# Patient Record
Sex: MALE
[De-identification: names, ages, dates, MRNs, and addresses within clinical notes are randomized per-mention and may not be internally consistent; named-entity substitution may affect disease eponyms.]

---

## 2024-01-30 PROBLEM — Z00.00 ENCOUNTER FOR PREVENTIVE HEALTH EXAMINATION: Status: ACTIVE | Noted: 2024-01-30

## 2024-01-31 ENCOUNTER — APPOINTMENT (OUTPATIENT)
Age: 69
End: 2024-01-31
Payer: MEDICARE

## 2024-01-31 ENCOUNTER — NON-APPOINTMENT (OUTPATIENT)
Age: 69
End: 2024-01-31

## 2024-01-31 VITALS
HEART RATE: 77 BPM | OXYGEN SATURATION: 98 % | SYSTOLIC BLOOD PRESSURE: 112 MMHG | TEMPERATURE: 98.8 F | DIASTOLIC BLOOD PRESSURE: 70 MMHG | BODY MASS INDEX: 29.66 KG/M2 | HEIGHT: 65 IN | WEIGHT: 178 LBS

## 2024-01-31 DIAGNOSIS — Z78.9 OTHER SPECIFIED HEALTH STATUS: ICD-10-CM

## 2024-01-31 PROCEDURE — 99204 OFFICE O/P NEW MOD 45 MIN: CPT

## 2024-01-31 PROCEDURE — G2211 COMPLEX E/M VISIT ADD ON: CPT

## 2024-01-31 RX ORDER — RANOLAZINE 500 MG/1
500 TABLET, FILM COATED, EXTENDED RELEASE ORAL
Refills: 0 | Status: ACTIVE | COMMUNITY

## 2024-02-01 LAB
ALBUMIN SERPL ELPH-MCNC: 4.6 G/DL
ALP BLD-CCNC: 63 U/L
ALT SERPL-CCNC: 20 U/L
ANION GAP SERPL CALC-SCNC: 11 MMOL/L
AST SERPL-CCNC: 23 U/L
BASOPHILS # BLD AUTO: 0.05 K/UL
BASOPHILS NFR BLD AUTO: 0.7 %
BILIRUB SERPL-MCNC: 0.5 MG/DL
BUN SERPL-MCNC: 19 MG/DL
CALCIUM SERPL-MCNC: 9.4 MG/DL
CHLORIDE SERPL-SCNC: 105 MMOL/L
CHOLEST SERPL-MCNC: 178 MG/DL
CO2 SERPL-SCNC: 22 MMOL/L
CREAT SERPL-MCNC: 1.13 MG/DL
EGFR: 72 ML/MIN/1.73M2
EOSINOPHIL # BLD AUTO: 0.34 K/UL
EOSINOPHIL NFR BLD AUTO: 4.8 %
ESTIMATED AVERAGE GLUCOSE: 97 MG/DL
GLUCOSE SERPL-MCNC: 93 MG/DL
HBA1C MFR BLD HPLC: 5 %
HCT VFR BLD CALC: 41.1 %
HDLC SERPL-MCNC: 39 MG/DL
HGB BLD-MCNC: 14.1 G/DL
IMM GRANULOCYTES NFR BLD AUTO: 0.4 %
LDLC SERPL CALC-MCNC: 108 MG/DL
LYMPHOCYTES # BLD AUTO: 1.46 K/UL
LYMPHOCYTES NFR BLD AUTO: 20.4 %
MAN DIFF?: NORMAL
MCHC RBC-ENTMCNC: 31.3 PG
MCHC RBC-ENTMCNC: 34.3 GM/DL
MCV RBC AUTO: 91.3 FL
MONOCYTES # BLD AUTO: 0.5 K/UL
MONOCYTES NFR BLD AUTO: 7 %
NEUTROPHILS # BLD AUTO: 4.76 K/UL
NEUTROPHILS NFR BLD AUTO: 66.7 %
NONHDLC SERPL-MCNC: 139 MG/DL
NT-PROBNP SERPL-MCNC: 149 PG/ML
PLATELET # BLD AUTO: 177 K/UL
POTASSIUM SERPL-SCNC: 4.5 MMOL/L
PROT SERPL-MCNC: 6.7 G/DL
RBC # BLD: 4.5 M/UL
RBC # FLD: 13 %
SODIUM SERPL-SCNC: 138 MMOL/L
TRIGL SERPL-MCNC: 179 MG/DL
TSH SERPL-ACNC: 2.34 UIU/ML
WBC # FLD AUTO: 7.14 K/UL

## 2024-02-01 NOTE — HISTORY OF PRESENT ILLNESS
[FreeTextEntry1] : 69 yo M PMHx of CAD w/prior PCI (2010), HTN, HLD, presents self-referred to establish care.   He retired in 2017 to [place] and has been in relative well health until a month ago. Reports that he was hospitalized three times for tachycardia and hypotension. States that he also experiences lightheadedness and palpitations occasionally. Reports that he was prescribed Eliquis (2.5mg po qd) and that his general cardiologist wanted to switch him from Coreg to Diltiazem. Presents today for second opinion. No additional acute complaints. He denies shortness of breath, chest pain, palpitations, lightheadedness, lower extremity edema, and orthopnea.

## 2024-02-01 NOTE — ASSESSMENT
[FreeTextEntry1] : 67 yo M PMHx of CAD s/p PCI, present to establish care for repeated bout of tachycardia which is likely paroxysmal atrial fibrillation.  #pAF ECG today with normal sinus rhythm  - Increase Coreg to 6.25 mg PO BID - Increase Eliquis 5mg PO BID  - Extended holter (14 days)  - TTE  - f/u ProBNP, BMP, TSH, a1c   #CAD C/w Eliquis and Plavix no bleeding events noted  - f/u cbc, lipid profile   Patient to have PCP fax medical records  RTC in 2 months   David Plaza MD [facility] Fellow, PGY-6  [facility], Department of Cardiology

## 2024-02-01 NOTE — END OF VISIT
[] : Fellow [FreeTextEntry3] : 67 yo man with remote PCI of RCA. Was doing well until he recently developed episodes of palpitations and dizziness.  Went to hospital in [place] where they detected "Atrial fibrillation" and gave him an injection that made him feel better.  ECG today shows NSR with low voltage in limb leads. Normal creatinine. Normal body size.  Prescribed Eliquis 2.5 mg bid and Coreg 3.125, feels like the episodes are less frequent.  - Increase Eliquis to 5 mg bid  - Increase Coreg to 6.25 mg bid  - Obtain TTE - 14 d holter  - RTC 2 months   Volodymyr Stephens MD

## 2024-02-15 ENCOUNTER — APPOINTMENT (OUTPATIENT)
Dept: HEART AND VASCULAR | Facility: CLINIC | Age: 69
End: 2024-02-15
Payer: MEDICARE

## 2024-02-15 PROCEDURE — 93306 TTE W/DOPPLER COMPLETE: CPT

## 2024-03-20 ENCOUNTER — APPOINTMENT (OUTPATIENT)
Dept: HEART AND VASCULAR | Facility: CLINIC | Age: 69
End: 2024-03-20
Payer: MEDICARE

## 2024-03-20 VITALS
HEART RATE: 88 BPM | OXYGEN SATURATION: 98 % | TEMPERATURE: 97.8 F | DIASTOLIC BLOOD PRESSURE: 70 MMHG | BODY MASS INDEX: 29.99 KG/M2 | WEIGHT: 180 LBS | HEIGHT: 65 IN | SYSTOLIC BLOOD PRESSURE: 120 MMHG

## 2024-03-20 DIAGNOSIS — I25.10 ATHEROSCLEROTIC HEART DISEASE OF NATIVE CORONARY ARTERY W/OUT ANGINA PECTORIS: ICD-10-CM

## 2024-03-20 DIAGNOSIS — I77.819 AORTIC ECTASIA, UNSPECIFIED SITE: ICD-10-CM

## 2024-03-20 PROCEDURE — 99215 OFFICE O/P EST HI 40 MIN: CPT

## 2024-03-20 PROCEDURE — G2211 COMPLEX E/M VISIT ADD ON: CPT

## 2024-03-20 RX ORDER — ATORVASTATIN CALCIUM 40 MG/1
40 TABLET, FILM COATED ORAL
Qty: 90 | Refills: 3 | Status: ACTIVE | COMMUNITY
Start: 1900-01-01 | End: 1900-01-01

## 2024-03-20 RX ORDER — CLOPIDOGREL BISULFATE 75 MG/1
75 TABLET, FILM COATED ORAL DAILY
Qty: 90 | Refills: 3 | Status: ACTIVE | COMMUNITY

## 2024-03-20 RX ORDER — APIXABAN 5 MG/1
5 TABLET, FILM COATED ORAL
Qty: 180 | Refills: 1 | Status: ACTIVE | COMMUNITY

## 2024-03-20 NOTE — PHYSICAL EXAM
[Well Developed] : well developed [Well Nourished] : well nourished [Normal Conjunctiva] : normal conjunctiva [No Acute Distress] : no acute distress [No Carotid Bruit] : no carotid bruit [Normal Venous Pressure] : normal venous pressure [No Murmur] : no murmur [Normal S1, S2] : normal S1, S2 [No Rub] : no rub [No Gallop] : no gallop [Clear Lung Fields] : clear lung fields [Good Air Entry] : good air entry [No Respiratory Distress] : no respiratory distress  [Soft] : abdomen soft [Non Tender] : non-tender [No Masses/organomegaly] : no masses/organomegaly [Normal Bowel Sounds] : normal bowel sounds [No Edema] : no edema [Normal Gait] : normal gait [No Clubbing] : no clubbing [No Cyanosis] : no cyanosis [No Rash] : no rash [No Varicosities] : no varicosities [No Skin Lesions] : no skin lesions [Moves all extremities] : moves all extremities [No Focal Deficits] : no focal deficits [Normal Speech] : normal speech [Alert and Oriented] : alert and oriented [Normal memory] : normal memory

## 2024-03-21 LAB
ANION GAP SERPL CALC-SCNC: 11 MMOL/L
BASOPHILS # BLD AUTO: 0.04 K/UL
BASOPHILS NFR BLD AUTO: 0.6 %
BUN SERPL-MCNC: 21 MG/DL
CALCIUM SERPL-MCNC: 9.6 MG/DL
CHLORIDE SERPL-SCNC: 105 MMOL/L
CO2 SERPL-SCNC: 23 MMOL/L
CREAT SERPL-MCNC: 1.23 MG/DL
EGFR: 65 ML/MIN/1.73M2
EOSINOPHIL # BLD AUTO: 0.44 K/UL
EOSINOPHIL NFR BLD AUTO: 6.8 %
GLUCOSE SERPL-MCNC: 115 MG/DL
HCT VFR BLD CALC: 42.3 %
HGB BLD-MCNC: 14.1 G/DL
IMM GRANULOCYTES NFR BLD AUTO: 0.5 %
LYMPHOCYTES # BLD AUTO: 1.71 K/UL
LYMPHOCYTES NFR BLD AUTO: 26.6 %
MAN DIFF?: NORMAL
MCHC RBC-ENTMCNC: 31.1 PG
MCHC RBC-ENTMCNC: 33.3 GM/DL
MCV RBC AUTO: 93.2 FL
MONOCYTES # BLD AUTO: 0.57 K/UL
MONOCYTES NFR BLD AUTO: 8.9 %
NEUTROPHILS # BLD AUTO: 3.65 K/UL
NEUTROPHILS NFR BLD AUTO: 56.6 %
PLATELET # BLD AUTO: 179 K/UL
POTASSIUM SERPL-SCNC: 4.8 MMOL/L
RBC # BLD: 4.54 M/UL
RBC # FLD: 13.2 %
SODIUM SERPL-SCNC: 139 MMOL/L
WBC # FLD AUTO: 6.44 K/UL

## 2024-03-21 NOTE — ASSESSMENT
[FreeTextEntry1] : 69 yo M PMHx of CAD s/p PCI, pAFib, HLD presenting for follow up appointment.   #pAF With recent episode of self limited palpitations which was likely atrial fibrillation Extended holter with Afib burden <1% Patient is interested in rhythm control strategies, will refer to [facility] ([place])  - Increase Coreg to 12.5 mg PO BID - c/w Eliquis 5mg PO BID  (01/24) Serum pro-, sCr 1.13, , HDL 39  - f/u BMP   #CAD C/w Eliquis and Plavix no bleeding events noted  - f/u cbc, lipid profile   RTC in 6 months   David Plaza MD [facility] Disease Fellow, PGY-6  [facility], Department of Cardiology

## 2024-03-21 NOTE — END OF VISIT
[] : Fellow [FreeTextEntry3] : 69 yo man with remote PCI of RCA. Was doing well until he recently developed episodes of palpitations and dizziness.  Went to hospital in [place] where they detected "Atrial fibrillation" and gave him an injection that made him feel better.  ECG today shows NSR with low voltage in limb leads. Normal creatinine. Normal body size.  Since last visit has been on Eliquis 5 mg bid and Coreg. Holter showed multiple episodes of apparent SVT and few of ?AFib TTE was normal He continues to be symptomatic with frequent palpitations - Refer to EP for possible ablation of these atrial arrhythmias vs. antiarrhythmic therapy - Continue Eliquis and Coreg   Volodymyr Stephens MD

## 2024-03-21 NOTE — CARDIOLOGY SUMMARY
[de-identified] : 1/31/24 ECG: NSR, low voltage [de-identified] : 02/15/2024: LVEF 68%, normal RV function, mild AR, dilated root and ascending aorta

## 2024-03-21 NOTE — HISTORY OF PRESENT ILLNESS
[FreeTextEntry1] : 67 yo M PMHx of pAfib (on Eliquis), CAD w/prior PCI (2010), HTN, HLD, presents for follow up appointment.   Previously presented for management of atrial fibrillation that occurred while in [place]. We initiated him on carvedilol and provided an extended holter monitor (14 days). The holter revealed a Afib burden of <1%, max  BPM and longest duration of 28 minutes (avg HR 54 BPM). He was generally well since the last appointment but last Sunday he experienced palpitations that limited him from walking. He called EMS but the palpitations resolved by the time of arrival. He is interested in rhythm control strategies because he is traveling to [place] in a few months. He denies shortness of breath, chest pain, lightheadedness, lower extremity edema, and orthopnea.

## 2024-04-10 ENCOUNTER — NON-APPOINTMENT (OUTPATIENT)
Age: 69
End: 2024-04-10

## 2024-04-10 ENCOUNTER — APPOINTMENT (OUTPATIENT)
Dept: HEART AND VASCULAR | Facility: CLINIC | Age: 69
End: 2024-04-10
Payer: MEDICARE

## 2024-04-10 VITALS
TEMPERATURE: 97.6 F | HEIGHT: 65 IN | OXYGEN SATURATION: 96 % | DIASTOLIC BLOOD PRESSURE: 74 MMHG | HEART RATE: 79 BPM | BODY MASS INDEX: 29.99 KG/M2 | SYSTOLIC BLOOD PRESSURE: 111 MMHG | WEIGHT: 180 LBS

## 2024-04-10 DIAGNOSIS — I48.0 PAROXYSMAL ATRIAL FIBRILLATION: ICD-10-CM

## 2024-04-10 PROCEDURE — 99204 OFFICE O/P NEW MOD 45 MIN: CPT | Mod: 25

## 2024-04-10 PROCEDURE — 93000 ELECTROCARDIOGRAM COMPLETE: CPT

## 2024-04-15 NOTE — REASON FOR VISIT
[Arrhythmia/ECG Abnorrmalities] : arrhythmia/ECG abnormalities [Other: ____] : [unfilled] [FreeTextEntry3] : Dr. Salazar

## 2024-04-15 NOTE — DISCUSSION/SUMMARY
[EKG obtained to assist in diagnosis and management of assessed problem(s)] : EKG obtained to assist in diagnosis and management of assessed problem(s) [FreeTextEntry1] : 69 yo M PMHx of pAfib (on Eliquis), CAD w/prior PCI (2010), HTN, HLD, presents for initial evaluation.   #Atrial Fibrillation  Episodes increasing in frequency and duration 2 prior admission to ER for RVR terminated with administration of medications Recent episode where he felt dizzy, rate noted to be 180bpm, self converted before EMS arrived  #Anticoagulation  We discussed in detail the normal conduction system of the heart and what atrial fibrillation is.  We discussed the natural progression of this arrhythmia in the context of any existing comorbidities.  We also discussed the association between atrial fibrillation and thrombotic events / stroke.  His CHADS score is at least 1 ( ) and he is currently on oral anticoagulation.    We discussed treatment options for paroxysmal atrial fibrillation including a rate control strategy vs. rhythm control with antiarrhythmic medications or an ablation.  The patent was counseled on the fact that there is no cure for atrial fibrillation and that for long term control of atrial fibrillation a combination of strategies if often used.  Regardless of treatment options and maintenance of sinus rhythm, anticoagulation is still recommended based on CHADSVASC score.    Success in rhythm control management is best defined as improved quality of life, maintenance of sinus rhythm and reduced hospitalization since not all patients have symptoms to diagnose "sub-clinical episodes".  We discussed that an ablation results in better long-term outcomes compared to medical therapy alone but repeat procedures and/or addition of medications may be required even after an ablation.  Results of ablation are better for paroxysmal patients compared to persistent patients, which are better than long-standing persistent patients.  Given that the patient is in paroxysmal atrial fibrillation currently we discussed that typical 3-5 year success rates (freedom from clinical atrial fibrillation) based on current literature was quoted to the patient at approximately 80%.  After discussing this the patient would like to proceed with an ablation.  We discussed the procedure in detail including risks, benefits, and drawbacks of each option in detail.   We discussed the procedure in detail including risks, benefits, and alternatives.  I have quoted him a 1:700 chance of major complication related to the procedure.  Risks including, but not limited to; infection, anesthesia reaction, bleeding, pain, vascular injury, cardiac perforation, esophageal injury/fistula,  TE/CVA, phrenic nerve injury, arrhythmia recurrence, need for emergent surgery and death were discussed.  We also discussed that having recurrent arrhythmia for the first 90 days post ablation is not predictive of long-term success of the ablation.    After consideration of this information, the decision was made to proceed with ablation. Mr. Wright  appeared to understand the whole discussion and verbalized that all his questions were answered to his satisfaction.  He was given pre procedure instructions and knows to call with any questions or concerns.

## 2024-04-15 NOTE — HISTORY OF PRESENT ILLNESS
[FreeTextEntry1] : 67 yo M PMHx of pAfib (on Eliquis), CAD w/prior PCI (2010), HTN, HLD, presents for initial evaluation.   Previously presented for management of atrial fibrillation that occurred while in [place]. We initiated him on carvedilol and provided an extended holter monitor (14 days). The holter revealed a Afib burden of <1%, max  BPM and longest duration of 28 minutes (avg HR 54 BPM).   Patient describes 2 prior episodes went to ER and converted after being giving medication. Most recently he describes one episodes consistent with AF with RVR (180bpm) where patient felt dizzy while out walking, returned home and called ambulance. He reports episodes are becoming more and symptoms include palpitations and dizziness.   Echo performed 2/15/2024 demonstrates normal function and structure.   He is interested in rhythm control strategies because he is traveling to [place] in a few months. He denies shortness of breath, chest pain, lightheadedness, lower extremity edema, and orthopnea.   He is compliant with eliquis 5mg BID.   He presents in NSR today.

## 2024-04-15 NOTE — CARDIOLOGY SUMMARY
[de-identified] : 1/31/24 ECG: NSR, low voltage 4/10/2024 NSRF 77bpm  [de-identified] : 02/15/2024: LVEF 68%, normal RV function, mild AR, dilated root and ascending aorta

## 2024-04-15 NOTE — END OF VISIT
[Time Spent: ___ minutes] : I have spent [unfilled] minutes of time on the encounter. [FreeTextEntry3] :  I, Panda Simmons, personally performed the services described in the documentation, reviewed the documentation recorded by the scribe in my presence and it accurately and completely records my words and actions.

## 2024-04-17 ENCOUNTER — NON-APPOINTMENT (OUTPATIENT)
Age: 69
End: 2024-04-17

## 2024-04-17 RX ORDER — SOTALOL HYDROCHLORIDE 80 MG/1
80 TABLET ORAL TWICE DAILY
Qty: 90 | Refills: 0 | Status: ACTIVE | COMMUNITY
Start: 2024-04-17

## 2024-04-29 RX ORDER — CARVEDILOL 12.5 MG/1
12.5 TABLET, FILM COATED ORAL TWICE DAILY
Qty: 180 | Refills: 3 | Status: ACTIVE | COMMUNITY
Start: 1900-01-01 | End: 1900-01-01

## 2024-09-04 ENCOUNTER — APPOINTMENT (OUTPATIENT)
Age: 69
End: 2024-09-04

## 2024-12-04 ENCOUNTER — APPOINTMENT (OUTPATIENT)
Dept: HEART AND VASCULAR | Facility: CLINIC | Age: 69
End: 2024-12-04

## 2024-12-06 ENCOUNTER — NON-APPOINTMENT (OUTPATIENT)
Age: 69
End: 2024-12-06

## 2024-12-06 ENCOUNTER — APPOINTMENT (OUTPATIENT)
Age: 69
End: 2024-12-06
Payer: MEDICARE

## 2024-12-06 VITALS
DIASTOLIC BLOOD PRESSURE: 63 MMHG | OXYGEN SATURATION: 96 % | SYSTOLIC BLOOD PRESSURE: 114 MMHG | WEIGHT: 170.99 LBS | HEART RATE: 82 BPM | HEIGHT: 65 IN | TEMPERATURE: 98.7 F | BODY MASS INDEX: 28.49 KG/M2

## 2024-12-06 PROCEDURE — G2211 COMPLEX E/M VISIT ADD ON: CPT

## 2024-12-06 PROCEDURE — 99214 OFFICE O/P EST MOD 30 MIN: CPT

## 2024-12-06 RX ORDER — CARVEDILOL 25 MG/1
25 TABLET, FILM COATED ORAL TWICE DAILY
Refills: 0 | Status: ACTIVE | COMMUNITY

## 2024-12-06 RX ORDER — APIXABAN 5 MG/1
5 TABLET, FILM COATED ORAL
Qty: 60 | Refills: 3 | Status: ACTIVE | COMMUNITY
Start: 2024-12-06 | End: 1900-01-01

## 2024-12-06 RX ORDER — DAPAGLIFLOZIN 10 MG/1
10 TABLET, FILM COATED ORAL
Qty: 30 | Refills: 4 | Status: ACTIVE | COMMUNITY
Start: 2024-12-06 | End: 1900-01-01

## 2024-12-06 RX ORDER — FUROSEMIDE 20 MG/1
20 TABLET ORAL DAILY
Qty: 30 | Refills: 3 | Status: ACTIVE | COMMUNITY

## 2024-12-07 LAB
ANION GAP SERPL CALC-SCNC: 13 MMOL/L
BASOPHILS # BLD AUTO: 0.03 K/UL
BASOPHILS NFR BLD AUTO: 0.3 %
BUN SERPL-MCNC: 18 MG/DL
CALCIUM SERPL-MCNC: 9.6 MG/DL
CHLORIDE SERPL-SCNC: 106 MMOL/L
CO2 SERPL-SCNC: 22 MMOL/L
CREAT SERPL-MCNC: 1.14 MG/DL
EGFR: 70 ML/MIN/1.73M2
EOSINOPHIL # BLD AUTO: 0.29 K/UL
EOSINOPHIL NFR BLD AUTO: 3.1 %
GLUCOSE SERPL-MCNC: 113 MG/DL
HCT VFR BLD CALC: 41.2 %
HGB BLD-MCNC: 13.5 G/DL
IMM GRANULOCYTES NFR BLD AUTO: 0.2 %
LYMPHOCYTES # BLD AUTO: 1.45 K/UL
LYMPHOCYTES NFR BLD AUTO: 15.7 %
MAN DIFF?: NORMAL
MCHC RBC-ENTMCNC: 29.9 PG
MCHC RBC-ENTMCNC: 32.8 G/DL
MCV RBC AUTO: 91.2 FL
MONOCYTES # BLD AUTO: 0.85 K/UL
MONOCYTES NFR BLD AUTO: 9.2 %
NEUTROPHILS # BLD AUTO: 6.6 K/UL
NEUTROPHILS NFR BLD AUTO: 71.5 %
NT-PROBNP SERPL-MCNC: 480 PG/ML
PLATELET # BLD AUTO: 194 K/UL
POTASSIUM SERPL-SCNC: 4.3 MMOL/L
RBC # BLD: 4.52 M/UL
RBC # FLD: 15.3 %
SODIUM SERPL-SCNC: 141 MMOL/L
WBC # FLD AUTO: 9.24 K/UL

## 2024-12-31 ENCOUNTER — APPOINTMENT (OUTPATIENT)
Dept: HEART AND VASCULAR | Facility: CLINIC | Age: 69
End: 2024-12-31
Payer: MEDICARE

## 2024-12-31 VITALS
BODY MASS INDEX: 28.32 KG/M2 | OXYGEN SATURATION: 98 % | SYSTOLIC BLOOD PRESSURE: 107 MMHG | HEIGHT: 65 IN | TEMPERATURE: 98.2 F | HEART RATE: 86 BPM | DIASTOLIC BLOOD PRESSURE: 66 MMHG | WEIGHT: 170 LBS

## 2024-12-31 DIAGNOSIS — I50.30 UNSPECIFIED DIASTOLIC (CONGESTIVE) HEART FAILURE: ICD-10-CM

## 2024-12-31 DIAGNOSIS — I25.10 ATHEROSCLEROTIC HEART DISEASE OF NATIVE CORONARY ARTERY W/OUT ANGINA PECTORIS: ICD-10-CM

## 2024-12-31 DIAGNOSIS — I48.0 PAROXYSMAL ATRIAL FIBRILLATION: ICD-10-CM

## 2024-12-31 DIAGNOSIS — R06.09 OTHER FORMS OF DYSPNEA: ICD-10-CM

## 2024-12-31 DIAGNOSIS — I77.810 THORACIC AORTIC ECTASIA: ICD-10-CM

## 2024-12-31 DIAGNOSIS — E78.5 HYPERLIPIDEMIA, UNSPECIFIED: ICD-10-CM

## 2024-12-31 PROCEDURE — G2211 COMPLEX E/M VISIT ADD ON: CPT

## 2024-12-31 PROCEDURE — 99215 OFFICE O/P EST HI 40 MIN: CPT

## 2024-12-31 PROCEDURE — 93000 ELECTROCARDIOGRAM COMPLETE: CPT

## 2024-12-31 PROCEDURE — 93246 EXT ECG>7D<15D RECORDING: CPT

## 2024-12-31 RX ORDER — CARVEDILOL 12.5 MG/1
12.5 TABLET, FILM COATED ORAL
Refills: 0 | Status: ACTIVE | COMMUNITY

## 2025-01-13 ENCOUNTER — NON-APPOINTMENT (OUTPATIENT)
Age: 70
End: 2025-01-13

## 2025-01-13 ENCOUNTER — APPOINTMENT (OUTPATIENT)
Dept: HEART AND VASCULAR | Facility: CLINIC | Age: 70
End: 2025-01-13
Payer: MEDICARE

## 2025-01-13 ENCOUNTER — APPOINTMENT (OUTPATIENT)
Age: 70
End: 2025-01-13
Payer: MEDICARE

## 2025-01-13 VITALS
BODY MASS INDEX: 28.82 KG/M2 | WEIGHT: 173 LBS | DIASTOLIC BLOOD PRESSURE: 65 MMHG | HEART RATE: 81 BPM | OXYGEN SATURATION: 95 % | TEMPERATURE: 97.8 F | SYSTOLIC BLOOD PRESSURE: 118 MMHG | HEIGHT: 65 IN

## 2025-01-13 PROCEDURE — G2211 COMPLEX E/M VISIT ADD ON: CPT

## 2025-01-13 PROCEDURE — 99214 OFFICE O/P EST MOD 30 MIN: CPT

## 2025-01-13 PROCEDURE — 93306 TTE W/DOPPLER COMPLETE: CPT

## 2025-01-13 RX ORDER — SACUBITRIL AND VALSARTAN 24; 26 MG/1; MG/1
24-26 TABLET, FILM COATED ORAL TWICE DAILY
Qty: 180 | Refills: 3 | Status: ACTIVE | COMMUNITY
Start: 2025-01-13 | End: 1900-01-01

## 2025-01-28 ENCOUNTER — APPOINTMENT (OUTPATIENT)
Dept: HEART AND VASCULAR | Facility: CLINIC | Age: 70
End: 2025-01-28
Payer: MEDICARE

## 2025-01-28 ENCOUNTER — NON-APPOINTMENT (OUTPATIENT)
Age: 70
End: 2025-01-28

## 2025-01-28 VITALS
OXYGEN SATURATION: 95 % | HEART RATE: 80 BPM | SYSTOLIC BLOOD PRESSURE: 107 MMHG | BODY MASS INDEX: 28.82 KG/M2 | HEIGHT: 65 IN | DIASTOLIC BLOOD PRESSURE: 64 MMHG | TEMPERATURE: 98.1 F | WEIGHT: 173 LBS

## 2025-01-28 PROCEDURE — 93000 ELECTROCARDIOGRAM COMPLETE: CPT

## 2025-01-28 PROCEDURE — 99214 OFFICE O/P EST MOD 30 MIN: CPT | Mod: 25

## 2025-01-29 ENCOUNTER — APPOINTMENT (OUTPATIENT)
Age: 70
End: 2025-01-29

## 2025-02-03 ENCOUNTER — NON-APPOINTMENT (OUTPATIENT)
Age: 70
End: 2025-02-03

## 2025-06-26 ENCOUNTER — RX RENEWAL (OUTPATIENT)
Age: 70
End: 2025-06-26